# Patient Record
Sex: FEMALE | URBAN - METROPOLITAN AREA
[De-identification: names, ages, dates, MRNs, and addresses within clinical notes are randomized per-mention and may not be internally consistent; named-entity substitution may affect disease eponyms.]

---

## 2018-06-14 ENCOUNTER — APPOINTMENT (RX ONLY)
Dept: URBAN - METROPOLITAN AREA CLINIC 148 | Facility: CLINIC | Age: 83
Setting detail: DERMATOLOGY
End: 2018-06-14

## 2018-06-14 DIAGNOSIS — L81.4 OTHER MELANIN HYPERPIGMENTATION: ICD-10-CM

## 2018-06-14 DIAGNOSIS — L57.0 ACTINIC KERATOSIS: ICD-10-CM

## 2018-06-14 DIAGNOSIS — L82.1 OTHER SEBORRHEIC KERATOSIS: ICD-10-CM

## 2018-06-14 DIAGNOSIS — L82.0 INFLAMED SEBORRHEIC KERATOSIS: ICD-10-CM

## 2018-06-14 PROBLEM — E78.5 HYPERLIPIDEMIA, UNSPECIFIED: Status: ACTIVE | Noted: 2018-06-14

## 2018-06-14 PROBLEM — K21.9 GASTRO-ESOPHAGEAL REFLUX DISEASE WITHOUT ESOPHAGITIS: Status: ACTIVE | Noted: 2018-06-14

## 2018-06-14 PROBLEM — L85.3 XEROSIS CUTIS: Status: ACTIVE | Noted: 2018-06-14

## 2018-06-14 PROBLEM — L29.8 OTHER PRURITUS: Status: ACTIVE | Noted: 2018-06-14

## 2018-06-14 PROBLEM — I10 ESSENTIAL (PRIMARY) HYPERTENSION: Status: ACTIVE | Noted: 2018-06-14

## 2018-06-14 PROCEDURE — 99203 OFFICE O/P NEW LOW 30 MIN: CPT | Mod: 25

## 2018-06-14 PROCEDURE — 17003 DESTRUCT PREMALG LES 2-14: CPT

## 2018-06-14 PROCEDURE — 17000 DESTRUCT PREMALG LESION: CPT | Mod: 59

## 2018-06-14 PROCEDURE — ? COUNSELING

## 2018-06-14 PROCEDURE — 17110 DESTRUCTION B9 LES UP TO 14: CPT

## 2018-06-14 PROCEDURE — ? LIQUID NITROGEN

## 2018-06-14 ASSESSMENT — LOCATION DETAILED DESCRIPTION DERM
LOCATION DETAILED: RIGHT PROXIMAL PRETIBIAL REGION
LOCATION DETAILED: RIGHT ANTERIOR DISTAL THIGH
LOCATION DETAILED: MIDDLE STERNUM
LOCATION DETAILED: LEFT INFRAMAMMARY CREASE (OUTER QUADRANT)
LOCATION DETAILED: LEFT MID-UPPER BACK
LOCATION DETAILED: LEFT SUPERIOR UPPER BACK
LOCATION DETAILED: RIGHT SUPERIOR MEDIAL MIDBACK

## 2018-06-14 ASSESSMENT — LOCATION ZONE DERM
LOCATION ZONE: TRUNK
LOCATION ZONE: LEG

## 2018-06-14 ASSESSMENT — LOCATION SIMPLE DESCRIPTION DERM
LOCATION SIMPLE: CHEST
LOCATION SIMPLE: LEFT UPPER BACK
LOCATION SIMPLE: RIGHT LOWER BACK
LOCATION SIMPLE: RIGHT THIGH
LOCATION SIMPLE: LEFT BREAST
LOCATION SIMPLE: RIGHT PRETIBIAL REGION

## 2018-06-14 NOTE — PROCEDURE: LIQUID NITROGEN
Post-Care Instructions: I reviewed with the patient in detail post-care instructions. Patient is to wear sunprotection, and avoid picking at any of the treated lesions. Pt may apply Vaseline to crusted or scabbing areas.
Render Post-Care Instructions In Note?: no
Number Of Freeze-Thaw Cycles: 2 freeze-thaw cycles
Duration Of Freeze Thaw-Cycle (Seconds): 3
Consent: The patient's consent was obtained including but not limited to risks of crusting, scabbing, blistering, scarring, darker or lighter pigmentary change, recurrence, incomplete removal and infection.
Detail Level: Simple
Detail Level: Detailed
Medical Necessity Information: It is in your best interest to select a reason for this procedure from the list below. All of these items fulfill various CMS LCD requirements except the new and changing color options.
Medical Necessity Clause: This procedure was medically necessary because the lesions that were treated were: at risk for and/or subject to recurrent physical trauma as a result of lesion type and location with history of the same, bleeding and irritated.

## 2018-09-18 ENCOUNTER — FOLLOW UP (OUTPATIENT)
Age: 83
End: 2018-09-18

## 2018-10-24 ASSESSMENT — TONOMETRY
OD_IOP_MMHG: 15
OS_IOP_MMHG: 13

## 2018-10-24 ASSESSMENT — VISUAL ACUITY
OS_SC: 20/400
OD_SC: CF 4FT
OD_PH: 20/400

## 2018-10-30 ENCOUNTER — FOLLOW UP (OUTPATIENT)
Age: 83
End: 2018-10-30

## 2018-10-30 VITALS — SYSTOLIC BLOOD PRESSURE: 167 MMHG | HEART RATE: 68 BPM | DIASTOLIC BLOOD PRESSURE: 82 MMHG | HEIGHT: 55 IN

## 2018-10-30 DIAGNOSIS — H35.3211: ICD-10-CM

## 2018-10-30 DIAGNOSIS — E11.3212: ICD-10-CM

## 2018-10-30 DIAGNOSIS — E11.3211: ICD-10-CM

## 2018-10-30 DIAGNOSIS — H34.8120: ICD-10-CM

## 2018-10-30 PROCEDURE — 92014 COMPRE OPH EXAM EST PT 1/>: CPT | Mod: 25

## 2018-10-30 PROCEDURE — 67028 INJECTION EYE DRUG: CPT

## 2018-10-30 PROCEDURE — 92134 CPTRZ OPH DX IMG PST SGM RTA: CPT

## 2018-10-30 PROCEDURE — 05MG LUCENTIS 0.5MG PREFILLED SYRINGE

## 2018-10-30 ASSESSMENT — TONOMETRY
OD_IOP_MMHG: 17.0
OS_IOP_MMHG: 12.0

## 2018-10-30 ASSESSMENT — VISUAL ACUITY
OD_SC: CF 2FT
OS_SC: 20/500
OS_PH: 20/400

## 2018-12-18 ENCOUNTER — FOLLOW UP (OUTPATIENT)
Age: 83
End: 2018-12-18

## 2018-12-18 DIAGNOSIS — E11.3213: ICD-10-CM

## 2018-12-18 DIAGNOSIS — H35.3211: ICD-10-CM

## 2018-12-18 DIAGNOSIS — H34.8120: ICD-10-CM

## 2018-12-18 PROCEDURE — 92014 COMPRE OPH EXAM EST PT 1/>: CPT | Mod: 25

## 2018-12-18 PROCEDURE — 67028 INJECTION EYE DRUG: CPT

## 2018-12-18 PROCEDURE — 05MG LUCENTIS 0.5MG PREFILLED SYRINGE

## 2018-12-18 PROCEDURE — 92134 CPTRZ OPH DX IMG PST SGM RTA: CPT

## 2018-12-18 ASSESSMENT — VISUAL ACUITY
OS_SC: 20/400
OS_PH: 20/300
OD_SC: CF 2FT

## 2018-12-18 ASSESSMENT — TONOMETRY
OS_IOP_MMHG: 17
OD_IOP_MMHG: 18

## 2019-02-19 ENCOUNTER — FOLLOW UP (OUTPATIENT)
Age: 84
End: 2019-02-19

## 2019-02-19 DIAGNOSIS — E11.3213: ICD-10-CM

## 2019-02-19 DIAGNOSIS — H34.8120: ICD-10-CM

## 2019-02-19 DIAGNOSIS — H35.3211: ICD-10-CM

## 2019-02-19 PROCEDURE — 67028 INJECTION EYE DRUG: CPT

## 2019-02-19 PROCEDURE — 05MG LUCENTIS 0.5MG PREFILLED SYRINGE

## 2019-02-19 PROCEDURE — 92014 COMPRE OPH EXAM EST PT 1/>: CPT | Mod: 25

## 2019-02-19 PROCEDURE — 92134 CPTRZ OPH DX IMG PST SGM RTA: CPT

## 2019-02-19 ASSESSMENT — TONOMETRY
OS_IOP_MMHG: 12
OD_IOP_MMHG: 11

## 2019-02-19 ASSESSMENT — VISUAL ACUITY
OS_SC: CF 3FT
OD_SC: CF 1FT

## 2019-04-16 ENCOUNTER — FOLLOW UP (OUTPATIENT)
Age: 84
End: 2019-04-16

## 2019-04-16 DIAGNOSIS — H35.3211: ICD-10-CM

## 2019-04-16 DIAGNOSIS — H34.8120: ICD-10-CM

## 2019-04-16 DIAGNOSIS — E11.3213: ICD-10-CM

## 2019-04-16 PROCEDURE — 05MG LUCENTIS 0.5MG PREFILLED SYRINGE

## 2019-04-16 PROCEDURE — 92134 CPTRZ OPH DX IMG PST SGM RTA: CPT

## 2019-04-16 PROCEDURE — 92014 COMPRE OPH EXAM EST PT 1/>: CPT | Mod: 25

## 2019-04-16 PROCEDURE — 67028 INJECTION EYE DRUG: CPT

## 2019-04-16 ASSESSMENT — TONOMETRY
OS_IOP_MMHG: 11
OD_IOP_MMHG: 10

## 2019-04-16 ASSESSMENT — VISUAL ACUITY
OS_SC: CF 4FT
OD_SC: CF 1FT

## 2019-06-04 ENCOUNTER — FOLLOW UP (OUTPATIENT)
Age: 84
End: 2019-06-04

## 2019-06-04 DIAGNOSIS — H35.3211: ICD-10-CM

## 2019-06-04 DIAGNOSIS — E11.3213: ICD-10-CM

## 2019-06-04 DIAGNOSIS — H34.8120: ICD-10-CM

## 2019-06-04 PROCEDURE — 92014 COMPRE OPH EXAM EST PT 1/>: CPT | Mod: 25

## 2019-06-04 PROCEDURE — 92134 CPTRZ OPH DX IMG PST SGM RTA: CPT

## 2019-06-04 PROCEDURE — 67028 INJECTION EYE DRUG: CPT

## 2019-06-04 PROCEDURE — 05MG LUCENTIS 0.5MG PREFILLED SYRINGE

## 2019-06-04 ASSESSMENT — VISUAL ACUITY
OS_SC: CF 5FT
OD_SC: CF 5FT

## 2019-06-04 ASSESSMENT — TONOMETRY
OS_IOP_MMHG: 14
OD_IOP_MMHG: 15

## 2019-07-30 ENCOUNTER — FOLLOW UP (OUTPATIENT)
Age: 84
End: 2019-07-30

## 2019-07-30 DIAGNOSIS — H34.8120: ICD-10-CM

## 2019-07-30 DIAGNOSIS — E11.3213: ICD-10-CM

## 2019-07-30 DIAGNOSIS — H35.3211: ICD-10-CM

## 2019-07-30 PROCEDURE — 92134 CPTRZ OPH DX IMG PST SGM RTA: CPT

## 2019-07-30 PROCEDURE — 67028 INJECTION EYE DRUG: CPT

## 2019-07-30 PROCEDURE — 05MG LUCENTIS 0.5MG PREFILLED SYRINGE

## 2019-07-30 PROCEDURE — 92014 COMPRE OPH EXAM EST PT 1/>: CPT | Mod: 25

## 2019-07-30 ASSESSMENT — TONOMETRY
OD_IOP_MMHG: 10
OS_IOP_MMHG: 12

## 2019-07-30 ASSESSMENT — VISUAL ACUITY
OD_SC: 20/400
OS_SC: 20/400

## 2019-09-03 ENCOUNTER — FOLLOW UP (OUTPATIENT)
Age: 84
End: 2019-09-03

## 2019-09-03 DIAGNOSIS — E11.3213: ICD-10-CM

## 2019-09-03 DIAGNOSIS — H34.8120: ICD-10-CM

## 2019-09-03 DIAGNOSIS — H35.3211: ICD-10-CM

## 2019-09-03 PROCEDURE — 05MG LUCENTIS 0.5MG PREFILLED SYRINGE

## 2019-09-03 PROCEDURE — 92014 COMPRE OPH EXAM EST PT 1/>: CPT | Mod: 25

## 2019-09-03 PROCEDURE — 67028 INJECTION EYE DRUG: CPT

## 2019-09-03 PROCEDURE — 92134 CPTRZ OPH DX IMG PST SGM RTA: CPT

## 2019-09-03 ASSESSMENT — VISUAL ACUITY
OS_SC: 20/400
OD_SC: 20/400

## 2019-09-03 ASSESSMENT — TONOMETRY
OS_IOP_MMHG: 17
OD_IOP_MMHG: 17

## 2019-10-29 ENCOUNTER — FOLLOW UP (OUTPATIENT)
Age: 84
End: 2019-10-29

## 2019-10-29 DIAGNOSIS — E11.3213: ICD-10-CM

## 2019-10-29 DIAGNOSIS — H35.3211: ICD-10-CM

## 2019-10-29 DIAGNOSIS — H34.8120: ICD-10-CM

## 2019-10-29 PROCEDURE — 92014 COMPRE OPH EXAM EST PT 1/>: CPT | Mod: 25

## 2019-10-29 PROCEDURE — 92134 CPTRZ OPH DX IMG PST SGM RTA: CPT

## 2019-10-29 PROCEDURE — 67028 INJECTION EYE DRUG: CPT

## 2019-10-29 PROCEDURE — 05MG LUCENTIS 0.5MG PREFILLED SYRINGE

## 2019-10-29 ASSESSMENT — VISUAL ACUITY
OS_SC: CF 6FT
OD_SC: CF 4FT

## 2019-10-29 ASSESSMENT — TONOMETRY
OS_IOP_MMHG: 17
OD_IOP_MMHG: 18

## 2019-12-31 ENCOUNTER — FOLLOW UP (OUTPATIENT)
Age: 84
End: 2019-12-31

## 2019-12-31 DIAGNOSIS — H35.3211: ICD-10-CM

## 2019-12-31 DIAGNOSIS — H25.12: ICD-10-CM

## 2019-12-31 DIAGNOSIS — H34.8120: ICD-10-CM

## 2019-12-31 DIAGNOSIS — E11.3213: ICD-10-CM

## 2019-12-31 PROCEDURE — 92134 CPTRZ OPH DX IMG PST SGM RTA: CPT

## 2019-12-31 PROCEDURE — 92014 COMPRE OPH EXAM EST PT 1/>: CPT | Mod: 25

## 2019-12-31 PROCEDURE — 67028 INJECTION EYE DRUG: CPT

## 2019-12-31 ASSESSMENT — VISUAL ACUITY
OS_SC: 20/400
OD_SC: CF 2FT

## 2019-12-31 ASSESSMENT — TONOMETRY
OD_IOP_MMHG: 16
OS_IOP_MMHG: 15

## 2021-01-15 ENCOUNTER — FOLLOW UP (OUTPATIENT)
Age: 86
End: 2021-01-15

## 2021-01-15 DIAGNOSIS — H34.8120: ICD-10-CM

## 2021-01-15 DIAGNOSIS — H35.3211: ICD-10-CM

## 2021-01-15 DIAGNOSIS — E11.3213: ICD-10-CM

## 2021-01-15 PROCEDURE — 92014 COMPRE OPH EXAM EST PT 1/>: CPT

## 2021-01-15 PROCEDURE — 92134 CPTRZ OPH DX IMG PST SGM RTA: CPT

## 2021-01-15 ASSESSMENT — VISUAL ACUITY
OD_SC: 20/200
OS_SC: 20/200

## 2021-01-15 ASSESSMENT — TONOMETRY
OD_IOP_MMHG: 20
OS_IOP_MMHG: 18

## 2021-05-21 ENCOUNTER — FOLLOW UP (OUTPATIENT)
Age: 86
End: 2021-05-21

## 2021-05-21 DIAGNOSIS — H35.3211: ICD-10-CM

## 2021-05-21 DIAGNOSIS — H34.8120: ICD-10-CM

## 2021-05-21 DIAGNOSIS — E11.3213: ICD-10-CM

## 2021-05-21 PROCEDURE — 92134 CPTRZ OPH DX IMG PST SGM RTA: CPT

## 2021-05-21 PROCEDURE — 92014 COMPRE OPH EXAM EST PT 1/>: CPT

## 2021-05-21 ASSESSMENT — VISUAL ACUITY
OD_SC: 20/400
OS_SC: 20/400

## 2021-05-21 ASSESSMENT — TONOMETRY
OD_IOP_MMHG: 19
OS_IOP_MMHG: 15

## 2021-08-20 ENCOUNTER — FOLLOW UP (OUTPATIENT)
Dept: URBAN - METROPOLITAN AREA CLINIC 111 | Facility: CLINIC | Age: 86
End: 2021-08-20

## 2021-08-20 DIAGNOSIS — H34.8120: ICD-10-CM

## 2021-08-20 DIAGNOSIS — H35.3211: ICD-10-CM

## 2021-08-20 DIAGNOSIS — E11.3213: ICD-10-CM

## 2021-08-20 PROCEDURE — 92134 CPTRZ OPH DX IMG PST SGM RTA: CPT

## 2021-08-20 PROCEDURE — 67028 INJECTION EYE DRUG: CPT

## 2021-08-20 PROCEDURE — 92014 COMPRE OPH EXAM EST PT 1/>: CPT | Mod: 25

## 2021-08-20 PROCEDURE — PFS EYLEA PFS

## 2021-08-20 ASSESSMENT — TONOMETRY
OS_IOP_MMHG: 23
OD_IOP_MMHG: 25

## 2021-08-20 ASSESSMENT — VISUAL ACUITY
OS_SC: 20/200-2
OD_SC: 20/400

## 2021-11-19 ENCOUNTER — FOLLOW UP (OUTPATIENT)
Dept: URBAN - METROPOLITAN AREA CLINIC 111 | Facility: CLINIC | Age: 86
End: 2021-11-19

## 2021-11-19 DIAGNOSIS — H34.8120: ICD-10-CM

## 2021-11-19 DIAGNOSIS — H35.3211: ICD-10-CM

## 2021-11-19 DIAGNOSIS — E11.3213: ICD-10-CM

## 2021-11-19 PROCEDURE — 92134 CPTRZ OPH DX IMG PST SGM RTA: CPT

## 2021-11-19 PROCEDURE — PFS EYLEA PFS

## 2021-11-19 PROCEDURE — 67028 INJECTION EYE DRUG: CPT

## 2021-11-19 PROCEDURE — 92014 COMPRE OPH EXAM EST PT 1/>: CPT | Mod: 25

## 2021-11-19 ASSESSMENT — VISUAL ACUITY
OD_SC: 4/200
OS_SC: 20/400

## 2021-11-19 ASSESSMENT — TONOMETRY
OD_IOP_MMHG: 19
OS_IOP_MMHG: 14

## 2023-01-26 ENCOUNTER — FOLLOW UP (OUTPATIENT)
Dept: URBAN - METROPOLITAN AREA CLINIC 111 | Facility: CLINIC | Age: 88
End: 2023-01-26

## 2023-01-26 DIAGNOSIS — E11.3213: ICD-10-CM

## 2023-01-26 DIAGNOSIS — H35.3211: ICD-10-CM

## 2023-01-26 DIAGNOSIS — H34.8120: ICD-10-CM

## 2023-01-26 PROCEDURE — PFS EYLEA PFS

## 2023-01-26 PROCEDURE — 67028 INJECTION EYE DRUG: CPT

## 2023-01-26 PROCEDURE — 92134 CPTRZ OPH DX IMG PST SGM RTA: CPT

## 2023-01-26 PROCEDURE — 92014 COMPRE OPH EXAM EST PT 1/>: CPT | Mod: 25

## 2023-01-26 PROCEDURE — 92202 OPSCPY EXTND ON/MAC DRAW: CPT

## 2023-01-26 ASSESSMENT — TONOMETRY
OD_IOP_MMHG: 16
OS_IOP_MMHG: 17

## 2023-01-26 ASSESSMENT — VISUAL ACUITY
OS_SC: 20/200-1
OD_SC: CF 4FT

## 2024-03-21 ENCOUNTER — FOLLOW UP (OUTPATIENT)
Dept: URBAN - METROPOLITAN AREA CLINIC 111 | Facility: CLINIC | Age: 89
End: 2024-03-21

## 2024-03-21 DIAGNOSIS — H35.3211: ICD-10-CM

## 2024-03-21 DIAGNOSIS — H34.8120: ICD-10-CM

## 2024-03-21 DIAGNOSIS — E11.3213: ICD-10-CM

## 2024-03-21 PROCEDURE — 92134 CPTRZ OPH DX IMG PST SGM RTA: CPT

## 2024-03-21 PROCEDURE — 92202 OPSCPY EXTND ON/MAC DRAW: CPT

## 2024-03-21 PROCEDURE — 92014 COMPRE OPH EXAM EST PT 1/>: CPT | Mod: 25

## 2024-03-21 ASSESSMENT — TONOMETRY
OD_IOP_MMHG: 17
OS_IOP_MMHG: 10

## 2024-03-21 ASSESSMENT — VISUAL ACUITY
OD_SC: CF 6FT
OS_SC: CF 6FT

## 2024-06-06 ENCOUNTER — FOLLOW UP (OUTPATIENT)
Dept: URBAN - METROPOLITAN AREA CLINIC 111 | Facility: CLINIC | Age: 89
End: 2024-06-06

## 2024-06-06 DIAGNOSIS — H35.3211: ICD-10-CM

## 2024-06-06 DIAGNOSIS — E11.3213: ICD-10-CM

## 2024-06-06 DIAGNOSIS — H34.8120: ICD-10-CM

## 2024-06-06 PROCEDURE — 92202 OPSCPY EXTND ON/MAC DRAW: CPT

## 2024-06-06 PROCEDURE — 92134 CPTRZ OPH DX IMG PST SGM RTA: CPT

## 2024-06-06 PROCEDURE — 92014 COMPRE OPH EXAM EST PT 1/>: CPT

## 2024-06-06 ASSESSMENT — TONOMETRY
OD_IOP_MMHG: 13
OS_IOP_MMHG: 12

## 2024-06-06 ASSESSMENT — VISUAL ACUITY
OS_SC: CF 5FT
OD_SC: CF 6FT